# Patient Record
Sex: FEMALE | Race: WHITE | Employment: OTHER | URBAN - METROPOLITAN AREA
[De-identification: names, ages, dates, MRNs, and addresses within clinical notes are randomized per-mention and may not be internally consistent; named-entity substitution may affect disease eponyms.]

---

## 2017-02-13 ENCOUNTER — OFFICE VISIT (OUTPATIENT)
Dept: OBGYN | Facility: CLINIC | Age: 72
End: 2017-02-13
Payer: COMMERCIAL

## 2017-02-13 ENCOUNTER — TRANSFERRED RECORDS (OUTPATIENT)
Dept: HEALTH INFORMATION MANAGEMENT | Facility: CLINIC | Age: 72
End: 2017-02-13

## 2017-02-13 VITALS
SYSTOLIC BLOOD PRESSURE: 124 MMHG | WEIGHT: 117 LBS | DIASTOLIC BLOOD PRESSURE: 74 MMHG | BODY MASS INDEX: 20.73 KG/M2 | HEART RATE: 80 BPM | HEIGHT: 63 IN

## 2017-02-13 DIAGNOSIS — Z01.419 ENCOUNTER FOR GYNECOLOGICAL EXAMINATION WITHOUT ABNORMAL FINDING: Primary | ICD-10-CM

## 2017-02-13 DIAGNOSIS — B97.7 HPV IN FEMALE: ICD-10-CM

## 2017-02-13 PROBLEM — I25.10 CARDIOVASCULAR DISEASE: Status: ACTIVE | Noted: 2017-02-13

## 2017-02-13 PROCEDURE — 88175 CYTOPATH C/V AUTO FLUID REDO: CPT | Performed by: OBSTETRICS & GYNECOLOGY

## 2017-02-13 PROCEDURE — 87624 HPV HI-RISK TYP POOLED RSLT: CPT | Performed by: OBSTETRICS & GYNECOLOGY

## 2017-02-13 PROCEDURE — 99397 PER PM REEVAL EST PAT 65+ YR: CPT | Performed by: OBSTETRICS & GYNECOLOGY

## 2017-02-13 RX ORDER — NITROGLYCERIN 0.4 MG/1
0.4 TABLET SUBLINGUAL
COMMUNITY
Start: 2016-12-16

## 2017-02-13 RX ORDER — CYANOCOBALAMIN (VITAMIN B-12) 2000 MCG
TABLET ORAL
COMMUNITY
Start: 2015-04-24

## 2017-02-13 ASSESSMENT — PATIENT HEALTH QUESTIONNAIRE - PHQ9: 5. POOR APPETITE OR OVEREATING: NOT AT ALL

## 2017-02-13 ASSESSMENT — ANXIETY QUESTIONNAIRES
7. FEELING AFRAID AS IF SOMETHING AWFUL MIGHT HAPPEN: NOT AT ALL
6. BECOMING EASILY ANNOYED OR IRRITABLE: NOT AT ALL
IF YOU CHECKED OFF ANY PROBLEMS ON THIS QUESTIONNAIRE, HOW DIFFICULT HAVE THESE PROBLEMS MADE IT FOR YOU TO DO YOUR WORK, TAKE CARE OF THINGS AT HOME, OR GET ALONG WITH OTHER PEOPLE: NOT DIFFICULT AT ALL
3. WORRYING TOO MUCH ABOUT DIFFERENT THINGS: NOT AT ALL
GAD7 TOTAL SCORE: 0
1. FEELING NERVOUS, ANXIOUS, OR ON EDGE: NOT AT ALL
2. NOT BEING ABLE TO STOP OR CONTROL WORRYING: NOT AT ALL
5. BEING SO RESTLESS THAT IT IS HARD TO SIT STILL: NOT AT ALL

## 2017-02-13 NOTE — PROGRESS NOTES
Zoraida is a 71 year old  female who presents for annual exam.     Besides routine health maintenance, she has no other health concerns today .    Do you have a Health Care Directive?: No: Advance care planning was reviewed with patient; patient declined at this time.    Fall risk:   Fallen 2 or more times in the past year?: No  Any fall with injury in the past year?: No    HPI:  The patient's PCP is Dr. Hernandez at [facility].    Patient has history of cardiac disease and has stents  Hasn't been seeing cardiology for awhile because her md retired  rec she make appt with Dr Rodrigez    History of chronic +HPV, prior hyst   Pap of vaginal cuff yearly    GYNECOLOGIC HISTORY:  No LMP recorded. Patient is postmenopausal..   reports that she has been smoking.  She has been smoking about 0.50 packs per day. She does not have any smokeless tobacco history on file.  Tobacco Cessation Action Plan: Information offered: Patient not interested at this time  Patient is sexually active.  STD testing offered?  Declined  Last PHQ-9 score on record=   PHQ-9 SCORE 2017   Total Score 0     Last GAD7 score on record=   LOC-7 SCORE 2/10/2016 2017   Total Score 0 0     Alcohol Score = 4    HEALTH MAINTENANCE:  Cholesterol: done about 1 month ago with Dr. Hernandez  Last Mammo: one year ago, Result: normal, Next Mammo: does not have one schedule yet   Pap:   Lab Results   Component Value Date    PAP NIL 02/10/2016    HPV+  DEXA:  3/26/2015  Colonoscopy:  2016, Result:  Normal, polyps, Next Colonoscopy: 4 years.    Health maintenance updated:  yes    HISTORY:  Obstetric History       T2      TAB0   SAB4   E0   M0   L2       # Outcome Date GA Lbr Charlie/2nd Weight Sex Delivery Anes PTL Lv   6 Term            5 Term            4 SAB            3 SAB            2 SAB            1 SAB                 Patient Active Problem List   Diagnosis     Smoking     History of cervical dysplasia     Gastroesophageal reflux  "disease without esophagitis     Dyslipidemia     Cardiovascular disease     Past Surgical History   Procedure Laterality Date     Laparoscopic assisted hysterectomy vaginal, bilateral salpingo-oophorectomy, combined  2006     recurrent PHILIPPE I      Social History   Substance Use Topics     Smoking status: Current Every Day Smoker     Packs/day: 0.50     Smokeless tobacco: Not on file     Alcohol use 0.0 oz/week     0 Standard drinks or equivalent per week      Problem (# of Occurrences) Relation (Name,Age of Onset)    Coronary Artery Disease (3) Mother, Father, Brother    Hyperlipidemia (3) Mother, Father, Brother            Current Outpatient Prescriptions   Medication Sig     VITAMIN D, CHOLECALCIFEROL, PO Take by mouth daily     Cyanocobalamin (B-12) 2000 MCG TABS Take 2,000-2,500 mcg by mouth     nitroglycerin (NITROSTAT) 0.4 MG sublingual tablet Place 0.4 mg under the tongue     aspirin 81 MG chewable tablet Take 1 tablet (81 mg) by mouth daily     atenolol (TENORMIN) 25 MG tablet Take 1 tablet (25 mg) by mouth daily     atorvastatin (LIPITOR) 10 MG tablet Take 20 mg by mouth daily      Multiple Vitamin (VITAMIN E/FOLIC ACID/B-6/B-12) CAPS Reported on 2/13/2017     No current facility-administered medications for this visit.        Allergies   Allergen Reactions     Penicillins      Sulfa Drugs        Past medical, surgical, social and family history were reviewed and updated in EPIC.    ROS:   12 point review of systems negative other than symptoms noted below.  Head: Nasal Congestion and Ringing  Respiratory: Cough  Genitourinary: Hot Flashes  Skin: Skin Dryness    EXAM:  /74 (BP Location: Right arm)  Pulse 80  Ht 5' 3\" (1.6 m)  Wt 117 lb (53.1 kg)  Breastfeeding? No  BMI 20.73 kg/m2   BMI: Body mass index is 20.73 kg/(m^2).    EXAM:  Constitutional: Appearance: Well nourished, well developed alert, in no acute distress  Neck:  Lymph Nodes:  No lymphadenopathy present    Thyroid:  Gland size normal, " nontender, no nodules or masses present  on palpation  Chest:  Respiratory Effort:  Breathing unlabored  Cardiovascular:Heart    Auscultation:  Regular rate, normal rhythm, no murmurs present  Breasts: Inspection of Breasts:  No lymphadenopathy present    Palpation of Breasts and Axillae:  No masses present on palpation, no  breast tenderness    Axillary Lymph Nodes:  No lymphadenopathy present  Gastrointestinal:  Abdominal Examination:  Abdomen nontender to palpation, tone normal without     rigidity or guarding, no masses present, umbilicus without lesions    Liver and speen:  No hepatomegaly present, liver nontender to palpation    Hernias:  No hernias present  Lymphatic: Lymph Nodes:  No other lymphadenopathy present  Skin:  General Inspection:  No rashes present, no lesions present, no areas of  discoloration.    Genitalia and Groin:  No rashes present, no lesions present, no areas of  discoloration, no masses present  Neurologic/Psychiatric:    Mental Status:  Oriented X3     Pelvic Exam:  External Genitalia:     Normal appearance for age, no discharge present, no tenderness present, no inflammatory lesions present, color normal  Vagina:     Normal vaginal vault without central or paravaginal defects, no discharge present, no inflammatory lesions present, no masses present  Bladder:     Nontender to palpation  Urethra:   Urethral Body:  Urethra palpation normal, urethra structural support normal   Urethral Meatus:  No erythema or lesions present  Cervix:     Surgically absent  Uterus:     Surgically absent  Adnexa:     No adnexal tenderness present, no adnexal masses present  Perineum:     Perineum within normal limits, no evidence of trauma, no rashes or skin lesions present  Anus:     Anus within normal limits, no hemorrhoids present  Inguinal Lymph Nodes:     No lymphadenopathy present  Pubic Hair:     Normal pubic hair distribution for age  Genitalia and Groin:     No rashes present, no lesions present, no  areas of discoloration, no masses present    COUNSELING:   Reviewed preventive health counseling, as reflected in patient instructions       discussed cardiac f/u    BMI:  Body mass index is 20.73 kg/(m^2).  Weight management plan noted, stable and monitoring   reports that she has been smoking.  She has been smoking about 0.50 packs per day. She does not have any smokeless tobacco history on file.  Tobacco Cessation Action Plan: Information offered: Patient not interested at this time    ASSESSMENT:  71 year old female with satisfactory annual exam.    ICD-10-CM    1. Encounter for gynecological examination without abnormal finding Z01.419        PLAN:  Pap done  Return 1 years  Annual mammogram    Jazmin Perez MD

## 2017-02-13 NOTE — Clinical Note
Please abstract the following data from this visit with this patient into the appropriate field in Epic:  Colonoscopy done on this date: 10/31/2016 (approximately), by this group: [facility], results were Normal, Polyps, Repeat 5 years.

## 2017-02-13 NOTE — MR AVS SNAPSHOT
"              After Visit Summary   2017    Zoraida Hammond    MRN: [id number]           Patient Information     Date Of Birth          1945        Visit Information        Provider Department      2017 8:30 AM Jazmin Perez MD [facility] [place]        Today's Diagnoses     Encounter for gynecological examination without abnormal finding    -  1       Follow-ups after your visit        Who to contact     If you have questions or need follow up information about today's clinic visit or your schedule please contact [facility] directly at [phone].  Normal or non-critical lab and imaging results will be communicated to you by Flipzuhart, letter or phone within 4 business days after the clinic has received the results. If you do not hear from us within 7 days, please contact the clinic through Tesarist or phone. If you have a critical or abnormal lab result, we will notify you by phone as soon as possible.  Submit refill requests through Guidesly or call your pharmacy and they will forward the refill request to us. Please allow 3 business days for your refill to be completed.          Additional Information About Your Visit        MyChart Information     Guidesly lets you send messages to your doctor, view your test results, renew your prescriptions, schedule appointments and more. To sign up, go to [email or web address] . Click on \"Log in\" on the left side of the screen, which will take you to the Welcome page. Then click on \"Sign up Now\" on the right side of the page.     You will be asked to enter the access code listed below, as well as some personal information. Please follow the directions to create your username and password.     Your access code is: [email or web address]  Expires: 2017  8:57 AM     Your access code will  in 90 days. If you need help or a new code, please call your [place] clinic or [phone].        Care EveryWhere ID     This is your " "Care EveryWhere ID. This could be used by other organizations to access your [place] medical records  [id number]        Your Vitals Were     Pulse Height Breastfeeding? BMI (Body Mass Index)          80 5' 3\" (1.6 m) No 20.73 kg/m2         Blood Pressure from Last 3 Encounters:   02/13/17 124/74   02/10/16 126/70   02/04/15 124/86    Weight from Last 3 Encounters:   02/13/17 117 lb (53.1 kg)   02/10/16 119 lb (54 kg)   02/04/15 116 lb (52.6 kg)              Today, you had the following     No orders found for display       Primary Care Provider Office Phone # Fax #    James Hernandez [phone]       [facility] [address] [facility] [address]  [facility] [address]        Thank you!     Thank you for choosing [facility] [place]  for your care. Our goal is always to provide you with excellent care. Hearing back from our patients is one way we can continue to improve our services. Please take a few minutes to complete the written survey that you may receive in the mail after your visit with us. Thank you!             Your Updated Medication List - Protect others around you: Learn how to safely use, store and throw away your medicines at www.disposemymeds.org.          This list is accurate as of: 2/13/17  8:57 AM.  Always use your most recent med list.                   Brand Name Dispense Instructions for use    aspirin 81 MG chewable tablet     108 tablet    Take 1 tablet (81 mg) by mouth daily       atenolol 25 MG tablet    TENORMIN    90 tablet    Take 1 tablet (25 mg) by mouth daily       atorvastatin 10 MG tablet    LIPITOR    30 tablet    Take 20 mg by mouth daily       B-12 2000 MCG Tabs      Take 2,000-2,500 mcg by mouth       nitroglycerin 0.4 MG sublingual tablet    NITROSTAT     Place 0.4 mg under the tongue       VITAMIN D (CHOLECALCIFEROL) PO      Take by mouth daily       Vitamin E/Folic Acid/B-6/B-12 Caps      Reported on 2/13/2017         "

## 2017-02-13 NOTE — NURSING NOTE
Please abstract the following data from this visit with this patient into the appropriate field in Epic:    Colonoscopy done on this date: 10/31/2016 (approximately), by this group: [facility], results were Normal, Polyps, Repeat 5 years.

## 2017-02-14 ASSESSMENT — PATIENT HEALTH QUESTIONNAIRE - PHQ9: SUM OF ALL RESPONSES TO PHQ QUESTIONS 1-9: 0

## 2017-02-14 ASSESSMENT — ANXIETY QUESTIONNAIRES: GAD7 TOTAL SCORE: 0

## 2017-02-15 LAB
COPATH REPORT: NORMAL
PAP: NORMAL

## 2017-02-17 LAB
FINAL DIAGNOSIS: ABNORMAL
HPV HR 12 DNA CVX QL NAA+PROBE: POSITIVE
HPV16 DNA SPEC QL NAA+PROBE: NEGATIVE
HPV18 DNA SPEC QL NAA+PROBE: NEGATIVE
SPECIMEN DESCRIPTION: ABNORMAL

## 2018-02-14 ENCOUNTER — OFFICE VISIT (OUTPATIENT)
Dept: OBGYN | Facility: CLINIC | Age: 73
End: 2018-02-14
Payer: COMMERCIAL

## 2018-02-14 ENCOUNTER — RADIANT APPOINTMENT (OUTPATIENT)
Dept: MAMMOGRAPHY | Facility: CLINIC | Age: 73
End: 2018-02-14
Payer: COMMERCIAL

## 2018-02-14 ENCOUNTER — RADIANT APPOINTMENT (OUTPATIENT)
Dept: BONE DENSITY | Facility: CLINIC | Age: 73
End: 2018-02-14
Payer: COMMERCIAL

## 2018-02-14 VITALS
DIASTOLIC BLOOD PRESSURE: 60 MMHG | WEIGHT: 112 LBS | SYSTOLIC BLOOD PRESSURE: 100 MMHG | BODY MASS INDEX: 19.84 KG/M2 | HEIGHT: 63 IN | HEART RATE: 84 BPM

## 2018-02-14 DIAGNOSIS — Z01.419 ENCOUNTER FOR GYNECOLOGICAL EXAMINATION WITHOUT ABNORMAL FINDING: Primary | ICD-10-CM

## 2018-02-14 DIAGNOSIS — B97.7 HPV IN FEMALE: ICD-10-CM

## 2018-02-14 DIAGNOSIS — Z78.0 ASYMPTOMATIC POSTMENOPAUSAL STATE: ICD-10-CM

## 2018-02-14 DIAGNOSIS — Z12.31 VISIT FOR SCREENING MAMMOGRAM: ICD-10-CM

## 2018-02-14 PROCEDURE — G0145 SCR C/V CYTO,THINLAYER,RESCR: HCPCS | Performed by: OBSTETRICS & GYNECOLOGY

## 2018-02-14 PROCEDURE — 77063 BREAST TOMOSYNTHESIS BI: CPT | Mod: TC

## 2018-02-14 PROCEDURE — 77080 DXA BONE DENSITY AXIAL: CPT | Performed by: OBSTETRICS & GYNECOLOGY

## 2018-02-14 PROCEDURE — 77067 SCR MAMMO BI INCL CAD: CPT | Mod: TC

## 2018-02-14 PROCEDURE — 99397 PER PM REEVAL EST PAT 65+ YR: CPT | Performed by: OBSTETRICS & GYNECOLOGY

## 2018-02-14 ASSESSMENT — ANXIETY QUESTIONNAIRES
3. WORRYING TOO MUCH ABOUT DIFFERENT THINGS: NOT AT ALL
5. BEING SO RESTLESS THAT IT IS HARD TO SIT STILL: NOT AT ALL
GAD7 TOTAL SCORE: 0
1. FEELING NERVOUS, ANXIOUS, OR ON EDGE: NOT AT ALL
7. FEELING AFRAID AS IF SOMETHING AWFUL MIGHT HAPPEN: NOT AT ALL
IF YOU CHECKED OFF ANY PROBLEMS ON THIS QUESTIONNAIRE, HOW DIFFICULT HAVE THESE PROBLEMS MADE IT FOR YOU TO DO YOUR WORK, TAKE CARE OF THINGS AT HOME, OR GET ALONG WITH OTHER PEOPLE: NOT DIFFICULT AT ALL
2. NOT BEING ABLE TO STOP OR CONTROL WORRYING: NOT AT ALL
6. BECOMING EASILY ANNOYED OR IRRITABLE: NOT AT ALL

## 2018-02-14 ASSESSMENT — PATIENT HEALTH QUESTIONNAIRE - PHQ9: 5. POOR APPETITE OR OVEREATING: NOT AT ALL

## 2018-02-14 NOTE — MR AVS SNAPSHOT
After Visit Summary   2/14/2018    Zoraida Hammond    MRN: [id number]           Patient Information     Date Of Birth          1945        Visit Information        Provider Department      2/14/2018 10:30 AM Jazmin Perez MD [facility]        Today's Diagnoses     Encounter for gynecological examination without abnormal finding    -  1    HPV in female           Follow-ups after your visit        Your next 10 appointments already scheduled     Feb 14, 2018 11:30 AM CST   DX HIP/PELVIS/SPINE with [id number]   [facility]    [address] [place] [id number] [phone]           Please do not take any of the following 24 hours prior to the day of your exam: vitamins, calcium tablets, antacids.  If possible, please wear clothes without metal (snaps, zippers). A sweatsuit works well.              Future tests that were ordered for you today     Open Future Orders        Priority Expected Expires Ordered    DX Hip/Pelvis/Spine Routine  2/14/2019 1/30/2018            Who to contact     If you have questions or need follow up information about today's clinic visit or your schedule please contact [facility] directly at [phone].  Normal or non-critical lab and imaging results will be communicated to you by infoBizzhart, letter or phone within 4 business days after the clinic has received the results. If you do not hear from us within 7 days, please contact the clinic through infoBizzhart or phone. If you have a critical or abnormal lab result, we will notify you by phone as soon as possible.  Submit refill requests through Doodle or call your pharmacy and they will forward the refill request to us. Please allow 3 business days for your refill to be completed.          Additional Information About Your Visit        Doodle Information     Doodle lets you send messages to your doctor, view  "your test results, renew your prescriptions, schedule appointments and more. To sign up, go to [email or web address] . Click on \"Log in\" on the left side of the screen, which will take you to the Welcome page. Then click on \"Sign up Now\" on the right side of the page.     You will be asked to enter the access code listed below, as well as some personal information. Please follow the directions to create your username and password.     Your access code is: [email or web address]  Expires: 5/15/2018 11:19 AM     Your access code will  in 90 days. If you need help or a new code, please call your [place] clinic or [phone].        Care EveryWhere ID     This is your Care EveryWhere ID. This could be used by other organizations to access your [place] medical records  [id number]        Your Vitals Were     Pulse Height Breastfeeding? BMI (Body Mass Index)          84 5' 2.5\" (1.588 m) No 20.16 kg/m2         Blood Pressure from Last 3 Encounters:   18 100/60   17 124/74   02/10/16 126/70    Weight from Last 3 Encounters:   18 112 lb (50.8 kg)   17 117 lb (53.1 kg)   02/10/16 119 lb (54 kg)              We Performed the Following     Pap imaged thin layer screen only - recommended age 21 - 24 years        Primary Care Provider Office Phone # Fax #    James Hernandez [phone]       [facility] [address] [facility] [address]  [facility] [address]        Equal Access to Services     [facility] : Hadii idlma Barriga, wageorgetetda pardeep, qaybta antonioalisidoro tineo. So [facility] [phone].    ATENCIÓN: Si habla español, tiene a barnes disposición servicios gratuitos de asistencia lingüística. eLe al [phone].    We comply with applicable federal civil rights laws and [place] laws. We do not discriminate on the basis of race, color, national origin, age, disability, sex, sexual orientation, or gender identity.            Thank " you!     Thank you for choosing [facility] [place]  for your care. Our goal is always to provide you with excellent care. Hearing back from our patients is one way we can continue to improve our services. Please take a few minutes to complete the written survey that you may receive in the mail after your visit with us. Thank you!             Your Updated Medication List - Protect others around you: Learn how to safely use, store and throw away your medicines at www.disposemymeds.org.          This list is accurate as of 2/14/18 11:19 AM.  Always use your most recent med list.                   Brand Name Dispense Instructions for use Diagnosis    aspirin 81 MG chewable tablet     108 tablet    Take 1 tablet (81 mg) by mouth daily        atenolol 25 MG tablet    TENORMIN    90 tablet    Take 1 tablet (25 mg) by mouth daily        atorvastatin 10 MG tablet    LIPITOR    30 tablet    Take 20 mg by mouth daily        B-12 2000 MCG Tabs      Take 2,000-2,500 mcg by mouth        nitroGLYcerin 0.4 MG sublingual tablet    NITROSTAT     Place 0.4 mg under the tongue        VITAMIN D (CHOLECALCIFEROL) PO      Take by mouth daily        Vitamin E/Folic Acid/B-6/B-12 Caps      Reported on 2/13/2017

## 2018-02-14 NOTE — LETTER
February 19, 2018      Zoraida Hammond  [address] [facility] [address]    Dear ,      I am happy to inform you that your recent cervical cancer screening test (PAP smear) was normal.      Preventative screenings such as this help to ensure your health for years to come. You should repeat a pap smear in 1 year, unless otherwise directed.      You will still need to return to the clinic every year for your annual exam and other preventive tests.     Please contact the clinic at [phone] if you have further questions.       Sincerely,      Jazmin Perez MD/ken

## 2018-02-14 NOTE — PROGRESS NOTES
Zoraida is a 72 year old  female who presents for annual exam.     Besides routine health maintenance, she has no other health concerns today .    Do you have a Health Care Directive?: No: Advance care planning reviewed with patient; information given to patient to review.    Fall risk:   Fallen 2 or more times in the past year?: No  Any fall with injury in the past year?: No    HPI:  The patient's PCP is  James Hernandez MD.    Patient has cardiac stents and hasn't seen cardiology for awhile  Gave her name of Dr Rodrigez since hers retired  Needs to stop smoking    Chronic vaginal dysplasia  We are doing yearly pap only to follow without hpv testing     GYNECOLOGIC HISTORY:  No LMP recorded. Patient is postmenopausal..   reports that she has been smoking.  She has been smoking about 0.50 packs per day. She has never used smokeless tobacco.  Tobacco Cessation Action Plan: Information offered: Patient not interested at this time  Patient is sexually active.  STD testing offered?  Declined  Last PHQ-9 score on record=   PHQ-9 SCORE 2018   Total Score 0     Last GAD7 score on record=   LOC-7 SCORE 2/10/2016 2017 2018   Total Score 0 0 0     Alcohol Score = 4    HEALTH MAINTENANCE:  Cholesterol: 2016   Total= 169, Triglycerides=77, HDL=57, LDL=97  Last Mammo: 2017, Result: normal, Next Mammo: Due   Pap: 2017 Neg, Other HPV +  Lab Results   Component Value Date    PAP NIL 2017    PAP NIL 02/10/2016      DEXA:  3/26/2015  Colonoscopy:  10/31/2016, Result:  Polyps/diverticulosis, Next Colonoscopy: 5 years.    Health maintenance updated:  yes    HISTORY:  Obstetric History       T2      L2     SAB4   TAB0   Ectopic0   Multiple0   Live Births0       # Outcome Date GA Lbr Charlie/2nd Weight Sex Delivery Anes PTL Lv   6 Term            5 Term            4 SAB            3 SAB            2 SAB            1 SAB                 Patient Active Problem List   Diagnosis      "Smoking     History of cervical dysplasia     Gastroesophageal reflux disease without esophagitis     Dyslipidemia     Cardiovascular disease     Past Surgical History:   Procedure Laterality Date     LAPAROSCOPIC ASSISTED HYSTERECTOMY VAGINAL, BILATERAL SALPINGO-OOPHORECTOMY, COMBINED  2006    recurrent PHILIPPE I      Social History   Substance Use Topics     Smoking status: Current Every Day Smoker     Packs/day: 0.50     Smokeless tobacco: Never Used     Alcohol use 0.0 oz/week     0 Standard drinks or equivalent per week      Problem (# of Occurrences) Relation (Name,Age of Onset)    Coronary Artery Disease (3) Mother, Father, Brother    Hyperlipidemia (3) Mother, Father, Brother            Current Outpatient Prescriptions   Medication Sig     VITAMIN D, CHOLECALCIFEROL, PO Take by mouth daily     Cyanocobalamin (B-12) 2000 MCG TABS Take 2,000-2,500 mcg by mouth     nitroglycerin (NITROSTAT) 0.4 MG sublingual tablet Place 0.4 mg under the tongue     aspirin 81 MG chewable tablet Take 1 tablet (81 mg) by mouth daily     atenolol (TENORMIN) 25 MG tablet Take 1 tablet (25 mg) by mouth daily     atorvastatin (LIPITOR) 10 MG tablet Take 20 mg by mouth daily      Multiple Vitamin (VITAMIN E/FOLIC ACID/B-6/B-12) CAPS Reported on 2/13/2017     No current facility-administered medications for this visit.        Allergies   Allergen Reactions     Penicillins      Sulfa Drugs        Past medical, surgical, social and family history were reviewed and updated in EPIC.    ROS:   12 point review of systems negative other than symptoms noted below.  Head: Nasal Congestion  Genitourinary: Hot Flashes and Night Sweats  Skin: Skin Dryness  Endocrine: Loss of Hair    EXAM:  /60  Pulse 84  Ht 5' 2.5\" (1.588 m)  Wt 112 lb (50.8 kg)  Breastfeeding? No  BMI 20.16 kg/m2   BMI: Body mass index is 20.16 kg/(m^2).    EXAM:  Constitutional: Appearance: Well nourished, well developed alert, in no acute distress  Neck:  Lymph Nodes:  " No lymphadenopathy present    Thyroid:  Gland size normal, nontender, no nodules or masses present  on palpation  Chest:  Respiratory Effort:  Breathing unlabored  Cardiovascular:Heart    Auscultation:  Regular rate, normal rhythm, no murmurs present  Breasts: Inspection of Breasts:  No lymphadenopathy present., Palpation of Breasts and Axillae:  No masses present on palpation, no breast tenderness., Axillary Lymph Nodes:  No lymphadenopathy present. and No nodularity, asymmetry or nipple discharge bilaterally.  Gastrointestinal:  Abdominal Examination:  Abdomen nontender to palpation, tone normal without     rigidity or guarding, no masses present, umbilicus without lesions    Liver and speen:  No hepatomegaly present, liver nontender to palpation    Hernias:  No hernias present  Lymphatic: Lymph Nodes:  No other lymphadenopathy present  Skin:  General Inspection:  No rashes present, no lesions present, no areas of  discoloration.    Genitalia and Groin:  No rashes present, no lesions present, no areas of  discoloration, no masses present  Neurologic/Psychiatric:    Mental Status:  Oriented X3     Pelvic Exam:  External Genitalia:     Normal appearance for age, no discharge present, no tenderness present, no inflammatory lesions present, color normal  Vagina:     Normal vaginal vault without central or paravaginal defects, no discharge present, no inflammatory lesions present, no masses present  Bladder:     Nontender to palpation  Urethra:   Urethral Body:  Urethra palpation normal, urethra structural support normal   Urethral Meatus:  No erythema or lesions present  Cervix:     Surgically absent  Uterus:     Surgically absent  Adnexa:     No adnexal tenderness present, no adnexal masses present  Perineum:     Perineum within normal limits, no evidence of trauma, no rashes or skin lesions present  Anus:     Anus within normal limits, no hemorrhoids present  Inguinal Lymph Nodes:     No lymphadenopathy  present  Pubic Hair:     Normal pubic hair distribution for age  Genitalia and Groin:     No rashes present, no lesions present, no areas of discoloration, no masses present      COUNSELING:   Reviewed preventive health counseling, as reflected in patient instructions       smoking cessation discussed    BMI:  Body mass index is 20.16 kg/(m^2).     reports that she has been smoking.  She has been smoking about 0.50 packs per day. She has never used smokeless tobacco.  Tobacco Cessation Action Plan: Self help information given to patient    ASSESSMENT:  72 year old female with satisfactory annual exam.    ICD-10-CM    1. Encounter for gynecological examination without abnormal finding Z01.419 Pap imaged thin layer screen only - recommended age 21 - 24 years   2. HPV in female B97.7        PLAN:  rec stop smoking  F/u with cardiology  Annual pap only to follow for history of chronic vaginal hpv.  Will only proceed to colposcopy if VAIN 2 or greater.     Jazmin Perez MD

## 2018-02-15 ASSESSMENT — PATIENT HEALTH QUESTIONNAIRE - PHQ9: SUM OF ALL RESPONSES TO PHQ QUESTIONS 1-9: 0

## 2018-02-15 ASSESSMENT — ANXIETY QUESTIONNAIRES: GAD7 TOTAL SCORE: 0

## 2018-02-16 LAB
COPATH REPORT: NORMAL
PAP: NORMAL

## 2018-02-18 PROBLEM — N89.3 VAGINAL DYSPLASIA: Status: ACTIVE | Noted: 2018-02-18

## 2019-02-18 ENCOUNTER — OFFICE VISIT (OUTPATIENT)
Dept: OBGYN | Facility: CLINIC | Age: 74
End: 2019-02-18
Payer: COMMERCIAL

## 2019-02-18 ENCOUNTER — ANCILLARY PROCEDURE (OUTPATIENT)
Dept: MAMMOGRAPHY | Facility: CLINIC | Age: 74
End: 2019-02-18
Payer: COMMERCIAL

## 2019-02-18 VITALS
BODY MASS INDEX: 20.02 KG/M2 | SYSTOLIC BLOOD PRESSURE: 120 MMHG | WEIGHT: 113 LBS | DIASTOLIC BLOOD PRESSURE: 88 MMHG | HEIGHT: 63 IN

## 2019-02-18 DIAGNOSIS — Z12.31 VISIT FOR SCREENING MAMMOGRAM: ICD-10-CM

## 2019-02-18 DIAGNOSIS — Z01.419 ENCOUNTER FOR GYNECOLOGICAL EXAMINATION WITHOUT ABNORMAL FINDING: Primary | ICD-10-CM

## 2019-02-18 PROCEDURE — 77067 SCR MAMMO BI INCL CAD: CPT | Mod: TC

## 2019-02-18 PROCEDURE — G0145 SCR C/V CYTO,THINLAYER,RESCR: HCPCS | Performed by: OBSTETRICS & GYNECOLOGY

## 2019-02-18 PROCEDURE — 77063 BREAST TOMOSYNTHESIS BI: CPT | Mod: TC

## 2019-02-18 PROCEDURE — 99397 PER PM REEVAL EST PAT 65+ YR: CPT | Performed by: OBSTETRICS & GYNECOLOGY

## 2019-02-18 PROCEDURE — 87624 HPV HI-RISK TYP POOLED RSLT: CPT | Performed by: OBSTETRICS & GYNECOLOGY

## 2019-02-18 ASSESSMENT — ANXIETY QUESTIONNAIRES
7. FEELING AFRAID AS IF SOMETHING AWFUL MIGHT HAPPEN: NOT AT ALL
5. BEING SO RESTLESS THAT IT IS HARD TO SIT STILL: NOT AT ALL
3. WORRYING TOO MUCH ABOUT DIFFERENT THINGS: NOT AT ALL
IF YOU CHECKED OFF ANY PROBLEMS ON THIS QUESTIONNAIRE, HOW DIFFICULT HAVE THESE PROBLEMS MADE IT FOR YOU TO DO YOUR WORK, TAKE CARE OF THINGS AT HOME, OR GET ALONG WITH OTHER PEOPLE: NOT DIFFICULT AT ALL
6. BECOMING EASILY ANNOYED OR IRRITABLE: NOT AT ALL
1. FEELING NERVOUS, ANXIOUS, OR ON EDGE: NOT AT ALL
2. NOT BEING ABLE TO STOP OR CONTROL WORRYING: NOT AT ALL
GAD7 TOTAL SCORE: 0

## 2019-02-18 ASSESSMENT — PATIENT HEALTH QUESTIONNAIRE - PHQ9
5. POOR APPETITE OR OVEREATING: NOT AT ALL
SUM OF ALL RESPONSES TO PHQ QUESTIONS 1-9: 0

## 2019-02-18 ASSESSMENT — MIFFLIN-ST. JEOR: SCORE: 973.75

## 2019-02-18 NOTE — LETTER
February 25, 2019    Zoraida Hammond  [address] [facility] [id number]    Dear ,  This letter is regarding your recent Pap smear (cervical cancer screening) and Human Papillomavirus (HPV) test.  We are happy to inform you that your Pap smear result is normal. Cervical cancer is closely linked with certain types of HPV. Your results showed no evidence of high-risk HPV.  Therefore we recommend you return in 1 year for your next pap smear.  You will still need to return to the clinic every year for an annual exam and other preventive tests.  If you have additional questions regarding this result, please call our registered nurse, Katelynn at [phone].  Sincerely,    Jazmin Perez MD/ken

## 2019-02-18 NOTE — PROGRESS NOTES
Zoraida is a 73 year old  female who presents for annual exam.     Besides routine health maintenance, she has no other health concerns today .    Do you have a Health Care Directive?: No: Advance care planning was reviewed with patient; patient declined at this time.    Fall risk:   Fallen 2 or more times in the past year?: No  Any fall with injury in the past year?: No    HPI:  The patient's PCP is  James Hernandez MD.   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 Patient is postmenopausal..   reports that she has been smoking.  She has been smoking about 0.50 packs per day. she has never used smokeless tobacco.  Tobacco Cessation Action Plan: Information offered: Patient not interested at this time  Patient is sexually active.  STD testing offered?  Declined  Last PHQ-9 score on record=   PHQ-9 SCORE 2019   PHQ-9 Total Score 0     Last GAD7 score on record=   LOC-7 SCORE 2017   Total Score 0 0 0     Alcohol Score = 4    HEALTH MAINTENANCE:  Cholesterol: (No results found for: CHOL Done with PCP  Last Mammo: one year ago, Result: normal, Next Mammo: today   Pap:   Lab Results   Component Value Date    PAP NIL 2018    PAP NIL 2017    PAP NIL 02/10/2016      DEXA:    Colonoscopy:  , Result:  polyps, Next Colonoscopy:  years.    Health maintenance updated:  yes    HISTORY:  Obstetric History       T2      L2     SAB4   TAB0   Ectopic0   Multiple0   Live Births0       # Outcome Date GA Lbr Charlie/2nd Weight Sex Delivery Anes PTL Lv   6 Term            5 Term            4 SAB            3 SAB            2 SAB            1 SAB                 Patient Active Problem List   Diagnosis     Smoking     History of cervical dysplasia     Gastroesophageal reflux disease without esophagitis     Dyslipidemia     Cardiovascular disease     Vaginal  "dysplasia     Past Surgical History:   Procedure Laterality Date     HYSTERECTOMY, PAP STILL INDICATED      PAP ONLY.      LAPAROSCOPIC ASSISTED HYSTERECTOMY VAGINAL, BILATERAL SALPINGO-OOPHORECTOMY, COMBINED  2006    recurrent PH[facility]IPPE I      Social History     Tobacco Use     Smoking status: Current Every Day Smoker     Packs/day: 0.50     Smokeless tobacco: Never Used   Substance Use Topics     Alcohol use: Yes     Alcohol/week: 0.0 oz      Problem (# of Occurrences) Relation (Name,Age of Onset)    Coronary Artery Disease (3) Mother, Father, Brother    Hyperlipidemia (3) Mother, Father, Brother            Current Outpatient Medications   Medication Sig     aspirin 81 MG chewable tablet Take 1 tablet (81 mg) by mouth daily     atenolol (TENORMIN) 25 MG tablet Take 1 tablet (25 mg) by mouth daily     atorvastatin (LIPITOR) 10 MG tablet Take 20 mg by mouth daily      Cyanocobalamin (B-12) 2000 MCG TABS Take 2,000-2,500 mcg by mouth     VITAMIN D, CHOLECALCIFEROL, PO Take by mouth daily     Multiple Vitamin (VITAMIN E/FOLIC ACID/B-6/B-12) CAPS Reported on 2/13/2017     nitroglycerin (NITROSTAT) 0.4 MG sublingual tablet Place 0.4 mg under the tongue     No current facility-administered medications for this visit.        Allergies   Allergen Reactions     Penicillins      Sulfa Drugs        Past medical, surgical, social and family history were reviewed and updated in EPIC.    ROS:   12 point review of systems negative other than symptoms noted below.  Head: Nasal Congestion  Genitourinary: Night Sweats  Skin: Skin Dryness  Endocrine: Loss of Hair    EXAM:  /88   Ht 1.588 m (5' 2.5\")   Wt 51.3 kg (113 lb)   Breastfeeding? No   BMI 20.34 kg/m     BMI: Body mass index is 20.34 kg/m .    EXAM:  Constitutional: Appearance: Well nourished, well developed alert, in no acute distress  Neck:  Lymph Nodes:  No lymphadenopathy present    Thyroid:  Gland size normal, nontender, no nodules or masses present  on " palpation  Chest:  Respiratory Effort:  Breathing unlabored  Cardiovascular:Heart    Auscultation:  Regular rate, normal rhythm, no murmurs present  Breasts: Inspection of Breasts:  No lymphadenopathy present., Palpation of Breasts and Axillae:  No masses present on palpation, no breast tenderness., Axillary Lymph Nodes:  No lymphadenopathy present. and No nodularity, asymmetry or nipple discharge bilaterally.  Gastrointestinal:  Abdominal Examination:  Abdomen nontender to palpation, tone normal without     rigidity or guarding, no masses present, umbilicus without lesions    Liver and speen:  No hepatomegaly present, liver nontender to palpation    Hernias:  No hernias present  Lymphatic: Lymph Nodes:  No other lymphadenopathy present  Skin:  General Inspection:  No rashes present, no lesions present, no areas of  discoloration.    Genitalia and Groin:  No rashes present, no lesions present, no areas of  discoloration, no masses present  Neurologic/Psychiatric:    Mental Status:  Oriented X3     Pelvic Exam:  External Genitalia:     Normal appearance for age, no discharge present, no tenderness present, no inflammatory lesions present, color normal  Vagina:     Normal vaginal vault without central or paravaginal defects, no discharge present, no inflammatory lesions present, no masses present  Bladder:     Nontender to palpation  Urethra:   Urethral Body:  Urethra palpation normal, urethra structural support normal   Urethral Meatus:  No erythema or lesions present  Cervix:     Surgically absent  Uterus:     Surgically absent  Adnexa:     No adnexal tenderness present, no adnexal masses present  Perineum:     Perineum within normal limits, no evidence of trauma, no rashes or skin lesions present  Anus:     Anus within normal limits, no hemorrhoids present  Inguinal Lymph Nodes:     No lymphadenopathy present  Pubic Hair:     Normal pubic hair distribution for age  Genitalia and Groin:     No rashes present, no  lesions present, no areas of discoloration, no masses present      COUNSELING:   Reviewed preventive health counseling, as reflected in patient instructions       Regular exercise       Healthy diet/nutrition    BMI:  Body mass index is 20.34 kg/m .     reports that she has been smoking.  She has been smoking about 0.50 packs per day. she has never used smokeless tobacco.  Tobacco Cessation Action Plan: Information offered: Patient not interested at this time    ASSESSMENT:  73 year old female with satisfactory annual exam.    ICD-10-CM    1. Encounter for gynecological examination without abnormal finding Z01.419        PLAN:  Needs dexa  Pap and hpv done  mammogram    Jazmin Perez MD

## 2019-02-19 ASSESSMENT — ANXIETY QUESTIONNAIRES: GAD7 TOTAL SCORE: 0

## 2019-02-20 LAB
COPATH REPORT: NORMAL
PAP: NORMAL

## 2019-02-22 LAB
FINAL DIAGNOSIS: ABNORMAL
HPV HR 12 DNA CVX QL NAA+PROBE: POSITIVE
HPV16 DNA SPEC QL NAA+PROBE: NEGATIVE
HPV18 DNA SPEC QL NAA+PROBE: NEGATIVE
SPECIMEN DESCRIPTION: ABNORMAL
SPECIMEN SOURCE CVX/VAG CYTO: ABNORMAL

## 2020-02-24 NOTE — PROGRESS NOTES
Zoraida is a 74 year old  female who presents for annual exam.     Besides routine health maintenance, she has no other health concerns today .    Do you have a Health Care Directive?: Yes, patient states has an Advance Care Planning document and will bring a copy to the clinic.    Fall risk:   Fallen 2 or more times in the past year?: No  Any fall with injury in the past year?: No    HPI:  The patient's PCP is  James Hernandez MD.    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 Patient is postmenopausal..   reports that she has been smoking. She has been smoking about 0.50 packs per day. She has never used smokeless tobacco.    Patient is sexually active.  STD testing offered?  Declined  Last PHQ-9 score on record=   PHQ-9 SCORE 2019   PHQ-9 Total Score 0     Last GAD7 score on record=   LOC-7 SCORE 2017   Total Score 0 0 0     Alcohol Score = 4    HEALTH MAINTENANCE:  Cholesterol: 2019  Last Mammo: One year ago, Result: Normal, Next Mammo: Today   Pap:   Lab Results   Component Value Date    PAP NIL 2019    PAP NIL 2018    PAP NIL 2017      DEXA:  2015  Colonoscopy:  10/31/2016, Result:  Normal, Next Colonoscopy: 2 years.    Health maintenance updated:  yes    HISTORY:  OB History    Para Term  AB Living   6 2 2 0 4 2   SAB TAB Ectopic Multiple Live Births   4 0 0 0 0      # Outcome Date GA Lbr Charlie/2nd Weight Sex Delivery Anes PTL Lv   6 Term            5 Term            4 SAB            3 SAB            2 SAB            1 SAB              Patient Active Problem List   Diagnosis     Smoking     History of cervical dysplasia     Gastroesophageal reflux disease without esophagitis     Dyslipidemia     Cardiovascular disease     Vaginal dysplasia     Vaginal high risk  HPV DNA test positive     Past Surgical History:   Procedure Laterality Date     HYSTERECTOMY, PAP STILL INDICATED      PAP ONLY.      LAPAROSCOPIC ASSISTED HYSTERECTOMY VAGINAL, BILATERAL SALPINGO-OOPHORECTOMY, COMBINED  2006    recurrent PHILIPPE I      Social History     Tobacco Use     Smoking status: Current Every Day Smoker     Packs/day: 0.50     Smokeless tobacco: Never Used   Substance Use Topics     Alcohol use: Yes     Alcohol/week: 0.0 standard drinks      Problem (# of Occurrences) Relation (Name,Age of Onset)    Coronary Artery Disease (3) Mother, Father, Brother    Hyperlipidemia (3) Mother, Father, Brother            Current Outpatient Medications   Medication Sig     aspirin 81 MG chewable tablet Take 1 tablet (81 mg) by mouth daily     atenolol (TENORMIN) 25 MG tablet Take 1 tablet (25 mg) by mouth daily     atorvastatin (LIPITOR) 10 MG tablet Take 20 mg by mouth daily      Cyanocobalamin (B-12) 2000 MCG TABS Take 2,000-2,500 mcg by mouth     Multiple Vitamin (VITAMIN E/FOLIC ACID/B-6/B-12) CAPS Reported on 2/13/2017     nitroglycerin (NITROSTAT) 0.4 MG sublingual tablet Place 0.4 mg under the tongue     VITAMIN D, CHOLECALCIFEROL, PO Take by mouth daily     No current facility-administered medications for this visit.        Allergies   Allergen Reactions     Penicillins      Sulfa Drugs        Past medical, surgical, social and family history were reviewed and updated in EPIC.    ROS:   12 point review of systems negative other than symptoms noted below or in the HPI.  No urinary frequency or dysuria, bladder or kidney problems    EXAM:  There were no vitals taken for this visit.   BMI: There is no height or weight on file to calculate BMI.    EXAM:  Constitutional: Appearance: Well nourished, well developed alert, in no acute distress  Neck:  Lymph Nodes:  No lymphadenopathy present    Thyroid:  Gland size normal, nontender, no nodules or masses present  on palpation  Chest:  Respiratory Effort:   Breathing unlabored  Cardiovascular:Heart    Auscultation:  Regular rate, normal rhythm, no murmurs present  Breasts: Inspection of Breasts:  No lymphadenopathy present., Palpation of Breasts and Axillae:  No masses present on palpation, no breast tenderness., Axillary Lymph Nodes:  No lymphadenopathy present. and No nodularity, asymmetry or nipple discharge bilaterally.  Gastrointestinal:  Abdominal Examination:  Abdomen nontender to palpation, tone normal without     rigidity or guarding, no masses present, umbilicus without lesions    Liver and speen:  No hepatomegaly present, liver nontender to palpation    Hernias:  No hernias present  Lymphatic: Lymph Nodes:  No other lymphadenopathy present  Skin:  General Inspection:  No rashes present, no lesions present, no areas of  discoloration.    Genitalia and Groin:  No rashes present, no lesions present, no areas of  discoloration, no masses present  Neurologic/Psychiatric:    Mental Status:  Oriented X3     Pelvic Exam:  External Genitalia:     Normal appearance for age, no discharge present, no tenderness present, no inflammatory lesions present, color normal  Vagina:     Normal vaginal vault without central or paravaginal defects, no discharge present, no inflammatory lesions present, no masses present  Bladder:     Nontender to palpation  Urethra:   Urethral Body:  Urethra palpation normal, urethra structural support normal   Urethral Meatus:  No erythema or lesions present  Cervix:     Surgically absent  Uterus:     Surgically absent  Adnexa:     Surgically absent  Perineum:     Perineum within normal limits, no evidence of trauma, no rashes or skin lesions present  Anus:     Anus within normal limits, no hemorrhoids present  Inguinal Lymph Nodes:     No lymphadenopathy present    COUNSELING:   Reviewed preventive health counseling, as reflected in patient instructions       Regular exercise       Healthy diet/nutrition    BMI:  There is no height or weight on  file to calculate BMI.     reports that she has been smoking. She has been smoking about 0.50 packs per day. She has never used smokeless tobacco.  Declines to stop    ASSESSMENT:  74 year old female with satisfactory annual exam.    ICD-10-CM    1. Encounter for gynecological examination without abnormal finding Z01.419        PLAN:  We do pap of vaginal vault every couple yrs so not done this years  Mammogram  Discussed smoking risks      Jazmin Perez MD

## 2020-02-25 ENCOUNTER — ANCILLARY PROCEDURE (OUTPATIENT)
Dept: MAMMOGRAPHY | Facility: CLINIC | Age: 75
End: 2020-02-25
Payer: COMMERCIAL

## 2020-02-25 ENCOUNTER — OFFICE VISIT (OUTPATIENT)
Dept: OBGYN | Facility: CLINIC | Age: 75
End: 2020-02-25
Payer: COMMERCIAL

## 2020-02-25 VITALS
BODY MASS INDEX: 20.02 KG/M2 | HEIGHT: 63 IN | DIASTOLIC BLOOD PRESSURE: 78 MMHG | WEIGHT: 113 LBS | SYSTOLIC BLOOD PRESSURE: 122 MMHG

## 2020-02-25 DIAGNOSIS — Z12.31 VISIT FOR SCREENING MAMMOGRAM: ICD-10-CM

## 2020-02-25 DIAGNOSIS — Z01.419 ENCOUNTER FOR GYNECOLOGICAL EXAMINATION WITHOUT ABNORMAL FINDING: Primary | ICD-10-CM

## 2020-02-25 PROCEDURE — 77063 BREAST TOMOSYNTHESIS BI: CPT | Mod: TC

## 2020-02-25 PROCEDURE — 77067 SCR MAMMO BI INCL CAD: CPT | Mod: TC

## 2020-02-25 PROCEDURE — 99397 PER PM REEVAL EST PAT 65+ YR: CPT | Performed by: OBSTETRICS & GYNECOLOGY

## 2020-02-25 ASSESSMENT — ANXIETY QUESTIONNAIRES
5. BEING SO RESTLESS THAT IT IS HARD TO SIT STILL: NOT AT ALL
IF YOU CHECKED OFF ANY PROBLEMS ON THIS QUESTIONNAIRE, HOW DIFFICULT HAVE THESE PROBLEMS MADE IT FOR YOU TO DO YOUR WORK, TAKE CARE OF THINGS AT HOME, OR GET ALONG WITH OTHER PEOPLE: NOT DIFFICULT AT ALL
3. WORRYING TOO MUCH ABOUT DIFFERENT THINGS: NOT AT ALL
7. FEELING AFRAID AS IF SOMETHING AWFUL MIGHT HAPPEN: NOT AT ALL
6. BECOMING EASILY ANNOYED OR IRRITABLE: NOT AT ALL
1. FEELING NERVOUS, ANXIOUS, OR ON EDGE: NOT AT ALL
GAD7 TOTAL SCORE: 0
2. NOT BEING ABLE TO STOP OR CONTROL WORRYING: NOT AT ALL

## 2020-02-25 ASSESSMENT — MIFFLIN-ST. JEOR: SCORE: 980.65

## 2020-02-25 ASSESSMENT — PATIENT HEALTH QUESTIONNAIRE - PHQ9
5. POOR APPETITE OR OVEREATING: NOT AT ALL
SUM OF ALL RESPONSES TO PHQ QUESTIONS 1-9: 0

## 2020-02-26 ASSESSMENT — ANXIETY QUESTIONNAIRES: GAD7 TOTAL SCORE: 0

## 2021-04-07 NOTE — PROGRESS NOTES
Zoraida is a 75 year old  female who presents for annual exam.     Besides routine health maintenance, she has no other health concerns today .    Do you have a Health Care Directive?: Yes, patient states has an Advance Care Planning document and will bring a copy to the clinic.    Fall risk:   Fallen 2 or more times in the past year?: No  Any fall with injury in the past year?: No    HPI:  The patient's PCP is  James Hernandez MD.    Patient still smoking- 50 years history  Was sent for lung cancer screening    Still working as [email or web address]  We are still doing vaginal pap smears every 2 years due to her history of severe vaginal dysplasia. No HPV testing. Last one was 2019.  No cervix since had hysterectomy     Patient lost wt during covid but getting better  Doesn't want to stop smoking      GYNECOLOGIC HISTORY:  No LMP recorded. Patient is postmenopausal..   reports that she has been smoking. She has been smoking about 0.50 packs per day. She has never used smokeless tobacco.    Patient is sexually active.  STD testing offered?  Declined  Last PHQ-9 score on record=   PHQ-9 SCORE 2021   PHQ-9 Total Score 0     Last GAD7 score on record=   LOC-7 SCORE 2019   Total Score 0 0 0     Alcohol Score = 4    HEALTH MAINTENANCE:  Cholesterol: (No results found for: CHOL   Last Mammo: One year ago, Result: Normal, Next Mammo: Today   Pap: (  Lab Results   Component Value Date    PAP NIL HPV- 2019    PAP NIL 2018    PAP NIL 2017    )  DEXA:  18  Colonoscopy:10/31/2016)Re sult:  Normal, Next Colonoscopy: 10 years.    Health maintenance updated:  yes    HISTORY:  OB History    Para Term  AB Living   6 2 2 0 4 2   SAB TAB Ectopic Multiple Live Births   4 0 0 0 0      # Outcome Date GA Lbr Charlie/2nd Weight Sex Delivery Anes PTL Lv   6 Term            5 Term            4 SAB            3 SAB            2 SAB            1 SAB              Patient Active Problem  "List   Diagnosis     Smoking     History of cervical dysplasia     Gastroesophageal reflux disease without esophagitis     Dyslipidemia     Cardiovascular disease     Vaginal dysplasia     Vaginal high risk HPV DNA test positive     Past Surgical History:   Procedure Laterality Date     HYSTERECTOMY, PAP STILL INDICATED      PAP ONLY.      LAPAROSCOPIC ASSISTED HYSTERECTOMY VAGINAL, BILATERAL SALPINGO-OOPHORECTOMY, COMBINED  2006    recurrent PHILIPPE I      Social History     Tobacco Use     Smoking status: Current Every Day Smoker     Packs/day: 0.50     Smokeless tobacco: Never Used   Substance Use Topics     Alcohol use: Yes     Frequency: 4 or more times a week     Drinks per session: 3 or 4     Binge frequency: Never      Problem (# of Occurrences) Relation (Name,Age of Onset)    Coronary Artery Disease (3) Mother, Father, Brother    Hyperlipidemia (3) Mother, Father, Brother            Current Outpatient Medications   Medication Sig     aspirin 81 MG chewable tablet Take 1 tablet (81 mg) by mouth daily     atenolol (TENORMIN) 25 MG tablet Take 1 tablet (25 mg) by mouth daily     atorvastatin (LIPITOR) 10 MG tablet Take 20 mg by mouth daily      Cyanocobalamin (B-12) 2000 MCG TABS Take 2,000-2,500 mcg by mouth     Multiple Vitamin (VITAMIN E/FOLIC ACID/B-6/B-12) CAPS Reported on 2/13/2017     nitroglycerin (NITROSTAT) 0.4 MG sublingual tablet Place 0.4 mg under the tongue     VITAMIN D, CHOLECALCIFEROL, PO Take by mouth daily     No current facility-administered medications for this visit.        Allergies   Allergen Reactions     Penicillins      Sulfa Drugs        Past medical, surgical, social and family history were reviewed and updated in EPIC.    ROS:   12 point review of systems negative other than symptoms noted below or in the HPI.  No urinary frequency or dysuria, bladder or kidney problems    EXAM:  /78   Pulse 74   Ht 1.6 m (5' 3\")   Wt 49.9 kg (110 lb)   BMI 19.49 kg/m     BMI: Body mass " index is 19.49 kg/m .    EXAM:  Constitutional: Appearance: Well nourished, well developed alert, in no acute distress  Neck:  Lymph Nodes:  No lymphadenopathy present    Thyroid:  Gland size normal, nontender, no nodules or masses present  on palpation  Chest:  Respiratory Effort:  Breathing unlabored  Cardiovascular:Heart    Auscultation:  Regular rate, normal rhythm, no murmurs present  Breasts: Inspection of Breasts:  No lymphadenopathy present., Palpation of Breasts and Axillae:  No masses present on palpation, no breast tenderness., Axillary Lymph Nodes:  No lymphadenopathy present. and No nodularity, asymmetry or nipple discharge bilaterally.  Gastrointestinal:  Abdominal Examination:  Abdomen nontender to palpation, tone normal without     rigidity or guarding, no masses present, umbilicus without lesions    Liver and speen:  No hepatomegaly present, liver nontender to palpation    Hernias:  No hernias present  Lymphatic: Lymph Nodes:  No other lymphadenopathy present  Skin:  General Inspection:  No rashes present, no lesions present, no areas of  discoloration.    Genitalia and Groin:  No rashes present, no lesions present, no areas of  discoloration, no masses present  Neurologic/Psychiatric:    Mental Status:  Oriented X3     Pelvic Exam:  External Genitalia:     Normal appearance for age, no discharge present, no tenderness present, no inflammatory lesions present, color normal  Vagina:     Normal vaginal vault without central or paravaginal defects, no discharge present, no inflammatory lesions present, no masses present  Bladder:     Nontender to palpation  Urethra:   Urethral Body:  Urethra palpation normal, urethra structural support normal   Urethral Meatus:  No erythema or lesions present  No uterus present- prior hyst  Perineum:     Perineum within normal limits, no evidence of trauma, no rashes or skin lesions present  Anus:     Anus within normal limits, no hemorrhoids present  Inguinal Lymph  Nodes:     No lymphadenopathy present  Pubic Hair:     Normal pubic hair distribution for age  Genitalia and Groin:     No rashes present, no lesions present, no areas of discoloration, no masses present      COUNSELING:   Reviewed preventive health counseling, as reflected in patient instructions    BMI:  Body mass index is 19.49 kg/m .     reports that she has been smoking. She has been smoking about 0.50 packs per day. She has never used smokeless tobacco.  Tobacco Cessation Action Plan: Information offered: Patient not interested at this time    ASSESSMENT:  75 year old female with satisfactory annual exam.    ICD-10-CM    1. Encounter for gynecological examination without abnormal finding  Z01.419        PLAN:  Rec annual mammogram  Pap of vaginal vault done due to history of prior vaginal dysplasia  Discussed covid   She had vaccine  Would be good to stop smoking      Jazmin Perez MD

## 2021-04-08 ENCOUNTER — OFFICE VISIT (OUTPATIENT)
Dept: OBGYN | Facility: CLINIC | Age: 76
End: 2021-04-08
Payer: COMMERCIAL

## 2021-04-08 ENCOUNTER — ANCILLARY PROCEDURE (OUTPATIENT)
Dept: MAMMOGRAPHY | Facility: CLINIC | Age: 76
End: 2021-04-08
Payer: COMMERCIAL

## 2021-04-08 VITALS
SYSTOLIC BLOOD PRESSURE: 122 MMHG | HEART RATE: 74 BPM | DIASTOLIC BLOOD PRESSURE: 78 MMHG | WEIGHT: 110 LBS | HEIGHT: 63 IN | BODY MASS INDEX: 19.49 KG/M2

## 2021-04-08 DIAGNOSIS — Z12.31 VISIT FOR SCREENING MAMMOGRAM: ICD-10-CM

## 2021-04-08 DIAGNOSIS — N89.3 VAGINAL DYSPLASIA: ICD-10-CM

## 2021-04-08 DIAGNOSIS — Z01.419 ENCOUNTER FOR GYNECOLOGICAL EXAMINATION WITHOUT ABNORMAL FINDING: Primary | ICD-10-CM

## 2021-04-08 PROCEDURE — 77067 SCR MAMMO BI INCL CAD: CPT | Mod: TC | Performed by: RADIOLOGY

## 2021-04-08 PROCEDURE — G0145 SCR C/V CYTO,THINLAYER,RESCR: HCPCS | Performed by: OBSTETRICS & GYNECOLOGY

## 2021-04-08 PROCEDURE — 77063 BREAST TOMOSYNTHESIS BI: CPT | Mod: TC | Performed by: RADIOLOGY

## 2021-04-08 PROCEDURE — 99397 PER PM REEVAL EST PAT 65+ YR: CPT | Performed by: OBSTETRICS & GYNECOLOGY

## 2021-04-08 SDOH — HEALTH STABILITY: MENTAL HEALTH: HOW MANY STANDARD DRINKS CONTAINING ALCOHOL DO YOU HAVE ON A TYPICAL DAY?: 3 OR 4

## 2021-04-08 SDOH — HEALTH STABILITY: MENTAL HEALTH: HOW OFTEN DO YOU HAVE 6 OR MORE DRINKS ON ONE OCCASION?: NEVER

## 2021-04-08 SDOH — HEALTH STABILITY: MENTAL HEALTH: HOW OFTEN DO YOU HAVE A DRINK CONTAINING ALCOHOL?: 4 OR MORE TIMES A WEEK

## 2021-04-08 ASSESSMENT — ANXIETY QUESTIONNAIRES
6. BECOMING EASILY ANNOYED OR IRRITABLE: NOT AT ALL
3. WORRYING TOO MUCH ABOUT DIFFERENT THINGS: NOT AT ALL
IF YOU CHECKED OFF ANY PROBLEMS ON THIS QUESTIONNAIRE, HOW DIFFICULT HAVE THESE PROBLEMS MADE IT FOR YOU TO DO YOUR WORK, TAKE CARE OF THINGS AT HOME, OR GET ALONG WITH OTHER PEOPLE: NOT DIFFICULT AT ALL
5. BEING SO RESTLESS THAT IT IS HARD TO SIT STILL: NOT AT ALL
2. NOT BEING ABLE TO STOP OR CONTROL WORRYING: NOT AT ALL
GAD7 TOTAL SCORE: 0
7. FEELING AFRAID AS IF SOMETHING AWFUL MIGHT HAPPEN: NOT AT ALL
1. FEELING NERVOUS, ANXIOUS, OR ON EDGE: NOT AT ALL

## 2021-04-08 ASSESSMENT — PATIENT HEALTH QUESTIONNAIRE - PHQ9
5. POOR APPETITE OR OVEREATING: NOT AT ALL
SUM OF ALL RESPONSES TO PHQ QUESTIONS 1-9: 0

## 2021-04-08 ASSESSMENT — MIFFLIN-ST. JEOR: SCORE: 958.09

## 2021-04-09 ASSESSMENT — ANXIETY QUESTIONNAIRES: GAD7 TOTAL SCORE: 0

## 2021-04-12 LAB
COPATH REPORT: NORMAL
PAP: NORMAL

## 2023-05-10 ENCOUNTER — ANCILLARY ORDERS (OUTPATIENT)
Dept: OBGYN | Facility: CLINIC | Age: 78
End: 2023-05-10

## 2023-05-10 DIAGNOSIS — Z12.31 VISIT FOR SCREENING MAMMOGRAM: ICD-10-CM

## 2023-06-12 NOTE — PROGRESS NOTES
Zoraida is a 77 year old  female who presents for annual exam.     Besides routine health maintenance, she has no other health concerns today .    Do you have a Health Care Directive?: No, advance care planning information given to patient to review.  Patient plans to discuss their wishes with loved ones or provider.      Fall risk:   Fall Risk Assessment completed per order.    HPI:  The patient's PCP is  James Hernandez MD.    No gynecologic complaints    Per previous plan :We are still doing vaginal pap smears every 2 years due to her history of severe vaginal dysplasia. No HPV testing. Last one was .  No cervix since had hysterectomy     GYNECOLOGIC HISTORY:  No LMP recorded. Patient is postmenopausal..   reports that she has been smoking. She has been smoking an average of .5 packs per day. She has never used smokeless tobacco.  Tobacco Cessation Action Plan: Information offered: Patient not interested at this time  Patient is sexually active.  STD testing offered?  Declined  Last PHQ-9 score on record=       2021    11:28 AM   PHQ-9 SCORE   PHQ-9 Total Score 0     Last GAD7 score on record=       2019    12:09 PM 2020     8:57 AM 2021    11:28 AM   LOC-7 SCORE   Total Score 0 0 0       HEALTH MAINTENANCE:  Cholesterol: (No results found for: CHOL   Last Mammo: 20, Result: Normal, Next Mammo: Today   Pap: (  Lab Results   Component Value Date    PAP NIL-neg hpv 2021    PAP NIL-HRHPV + 2019    PAP NIL-neg hpv 2018      DEXA:  2019  Colonoscopy:  10/31/2016, Result:  Normal, Next Colonoscopy: discointinued    Health maintenance updated:  yes    HISTORY:  OB History    Para Term  AB Living   6 2 2 0 4 2   SAB IAB Ectopic Multiple Live Births   4 0 0 0 0      # Outcome Date GA Lbr Charlie/2nd Weight Sex Delivery Anes PTL Lv   6 Term            5 Term            4 SAB            3 SAB            2 SAB            1 SAB              Patient Active  Problem List   Diagnosis     Smoking     History of cervical dysplasia     Gastroesophageal reflux disease without esophagitis     Dyslipidemia     Cardiovascular disease     Vaginal dysplasia     Vaginal high risk HPV DNA test positive     Past Surgical History:   Procedure Laterality Date     HYSTERECTOMY, PAP STILL INDICATED      PAP ONLY.      LAPAROSCOPIC ASSISTED HYSTERECTOMY VAGINAL, BILATERAL SALPINGO-OOPHORECTOMY, COMBINED  2006    recurrent PHILIPPE I      Social History     Tobacco Use     Smoking status: Every Day     Packs/day: 0.50     Types: Cigarettes     Smokeless tobacco: Never   Vaping Use     Vaping status: Not on file   Substance Use Topics     Alcohol use: Yes      Problem (# of Occurrences) Relation (Name,Age of Onset)    Hyperlipidemia (3) Mother, Father, Brother    Coronary Artery Disease (3) Mother, Father, Brother            Current Outpatient Medications   Medication Sig     aspirin 81 MG chewable tablet Take 1 tablet (81 mg) by mouth daily     atenolol (TENORMIN) 25 MG tablet Take 1 tablet (25 mg) by mouth daily     Cyanocobalamin (B-12) 2000 MCG TABS Take 2,000-2,500 mcg by mouth     hydrOXYzine (ATARAX) 10 MG/5ML syrup Take 10 mg by mouth     Multiple Vitamin (VITAMIN E/FOLIC ACID/B-6/B-12) CAPS Reported on 2/13/2017     pravastatin (PRAVACHOL) 20 MG tablet Take 1 tablet by mouth At Bedtime     rivaroxaban ANTICOAGULANT (XARELTO ANTICOAGULANT) 2.5 MG TABS tablet Take 2.5 mg by mouth     VITAMIN D, CHOLECALCIFEROL, PO Take by mouth daily     atorvastatin (LIPITOR) 10 MG tablet Take 20 mg by mouth daily  (Patient not taking: Reported on 6/13/2023)     nitroglycerin (NITROSTAT) 0.4 MG sublingual tablet Place 0.4 mg under the tongue (Patient not taking: Reported on 6/13/2023)     No current facility-administered medications for this visit.       Allergies   Allergen Reactions     Penicillins      Sulfa Antibiotics        Past medical, surgical, social and family history were reviewed and  "updated in EPIC.    ROS:   12 point review of systems negative other than symptoms noted below or in the HPI.    EXAM:  /68   Ht 1.58 m (5' 2.21\")   Wt 50.4 kg (111 lb 3.2 oz)   BMI 20.20 kg/m     BMI: Body mass index is 20.2 kg/m .    EXAM:  Constitutional: Appearance: Well nourished, well developed alert, in no acute distress  Neck:  Lymph Nodes:  No lymphadenopathy present    Thyroid:  Gland size normal, nontender, no nodules or masses present  on palpation  Chest:  Respiratory Effort:  Breathing unlabored  Cardiovascular:Heart    Auscultation:  Regular rate, normal rhythm, no murmurs present  Breasts: Palpation of Breasts and Axillae:  No masses present on palpation, no breast tenderness., Axillary Lymph Nodes:  No lymphadenopathy present. and No nodularity, asymmetry or nipple discharge bilaterally.  Gastrointestinal:  Abdominal Examination:  Abdomen nontender to palpation, tone normal without     rigidity or guarding, no masses present, umbilicus without lesions    Liver and speen:  No hepatomegaly present, liver nontender to palpation    Hernias:  No hernias present  Lymphatic: Lymph Nodes:  No other lymphadenopathy present  Skin:  General Inspection:  No rashes present, no lesions present, no areas of  discoloration.    Genitalia and Groin:  No rashes present, no lesions present, no areas of  discoloration, no masses present  Neurologic/Psychiatric:    Mental Status:  Oriented X3     Pelvic Exam:  External Genitalia:     Normal appearance for age, no discharge present, no tenderness present, no inflammatory lesions present, color normal  Vagina:     Normal vaginal vault without central or paravaginal defects, no discharge present, no inflammatory lesions present, no masses present  Bladder:     Nontender to palpation  Urethra:   Urethral Body:  Urethra palpation normal, urethra structural support normal   Urethral Meatus:  No erythema or lesions present  Cervix:     Surgically absent  Uterus:  "    Surgically absent  Adnexa:     Surgically absent  Perineum:     Perineum within normal limits, no evidence of trauma, no rashes or skin lesions present  Anus:     Anus within normal limits, no hemorrhoids present  Inguinal Lymph Nodes:     No lymphadenopathy present    COUNSELING:   Reviewed preventive health counseling, as reflected in patient instructions  Special attention given to:       Regular exercise       Healthy diet/nutrition       Fall risk prevention    BMI:  Body mass index is 20.2 kg/m .  Weight management plan noted, stable and monitoring   reports that she has been smoking. She has been smoking an average of .5 packs per day. She has never used smokeless tobacco.  Tobacco Cessation Action Plan: Reviewed options for smoking cessation when patient is ready and interested.    ASSESSMENT:  77 year old female with satisfactory annual exam.    ICD-10-CM    1. Encounter for routine gynecologic examination in Medicare patient  Z01.419       2. Vaginal high risk HPV DNA test positive  R87.811 Pap thin layer screen only - recommended age 21 - 24 years          PLAN:  -Normal exam today.  -Vaginal pap.  If normal repeat in 2 years  -Smoking cessation encouraged  -mammogram today      Vannesa Cohen MD

## 2023-06-13 ENCOUNTER — ANCILLARY PROCEDURE (OUTPATIENT)
Dept: MAMMOGRAPHY | Facility: CLINIC | Age: 78
End: 2023-06-13
Payer: COMMERCIAL

## 2023-06-13 ENCOUNTER — OFFICE VISIT (OUTPATIENT)
Dept: OBGYN | Facility: CLINIC | Age: 78
End: 2023-06-13
Payer: COMMERCIAL

## 2023-06-13 ENCOUNTER — ANCILLARY ORDERS (OUTPATIENT)
Dept: OBGYN | Facility: CLINIC | Age: 78
End: 2023-06-13

## 2023-06-13 VITALS
SYSTOLIC BLOOD PRESSURE: 122 MMHG | BODY MASS INDEX: 20.46 KG/M2 | DIASTOLIC BLOOD PRESSURE: 68 MMHG | HEIGHT: 62 IN | WEIGHT: 111.2 LBS

## 2023-06-13 DIAGNOSIS — Z12.31 VISIT FOR SCREENING MAMMOGRAM: ICD-10-CM

## 2023-06-13 DIAGNOSIS — R87.811 VAGINAL HIGH RISK HPV DNA TEST POSITIVE: ICD-10-CM

## 2023-06-13 DIAGNOSIS — Z78.0 ASYMPTOMATIC POSTMENOPAUSAL STATE: ICD-10-CM

## 2023-06-13 DIAGNOSIS — Z01.419 ENCOUNTER FOR ROUTINE GYNECOLOGIC EXAMINATION IN MEDICARE PATIENT: Primary | ICD-10-CM

## 2023-06-13 PROCEDURE — 77067 SCR MAMMO BI INCL CAD: CPT | Mod: TC | Performed by: RADIOLOGY

## 2023-06-13 PROCEDURE — 99213 OFFICE O/P EST LOW 20 MIN: CPT | Performed by: OBSTETRICS & GYNECOLOGY

## 2023-06-13 PROCEDURE — G0145 SCR C/V CYTO,THINLAYER,RESCR: HCPCS | Performed by: OBSTETRICS & GYNECOLOGY

## 2023-06-13 PROCEDURE — 77063 BREAST TOMOSYNTHESIS BI: CPT | Mod: TC | Performed by: RADIOLOGY

## 2023-06-13 RX ORDER — PRAVASTATIN SODIUM 20 MG
1 TABLET ORAL AT BEDTIME
COMMUNITY
Start: 2023-05-16

## 2023-06-13 RX ORDER — HYDROXYZINE HCL 10 MG/5 ML
10 SOLUTION, ORAL ORAL
COMMUNITY
Start: 2022-02-11

## 2023-06-13 ASSESSMENT — ANXIETY QUESTIONNAIRES
GAD7 TOTAL SCORE: 0
GAD7 TOTAL SCORE: 0
3. WORRYING TOO MUCH ABOUT DIFFERENT THINGS: NOT AT ALL
5. BEING SO RESTLESS THAT IT IS HARD TO SIT STILL: NOT AT ALL
1. FEELING NERVOUS, ANXIOUS, OR ON EDGE: NOT AT ALL
7. FEELING AFRAID AS IF SOMETHING AWFUL MIGHT HAPPEN: NOT AT ALL
6. BECOMING EASILY ANNOYED OR IRRITABLE: NOT AT ALL
2. NOT BEING ABLE TO STOP OR CONTROL WORRYING: NOT AT ALL
IF YOU CHECKED OFF ANY PROBLEMS ON THIS QUESTIONNAIRE, HOW DIFFICULT HAVE THESE PROBLEMS MADE IT FOR YOU TO DO YOUR WORK, TAKE CARE OF THINGS AT HOME, OR GET ALONG WITH OTHER PEOPLE: NOT DIFFICULT AT ALL

## 2023-06-13 ASSESSMENT — PATIENT HEALTH QUESTIONNAIRE - PHQ9
SUM OF ALL RESPONSES TO PHQ QUESTIONS 1-9: 0
5. POOR APPETITE OR OVEREATING: NOT AT ALL

## 2023-06-15 LAB
BKR LAB AP GYN ADEQUACY: NORMAL
BKR LAB AP GYN INTERPRETATION: NORMAL
BKR LAB AP HPV REFLEX: NO
BKR LAB AP PREVIOUS ABNL DX: NORMAL
BKR LAB AP PREVIOUS ABNORMAL: NORMAL
PATH REPORT.COMMENTS IMP SPEC: NORMAL
PATH REPORT.COMMENTS IMP SPEC: NORMAL
PATH REPORT.RELEVANT HX SPEC: NORMAL

## 2023-06-16 ENCOUNTER — PATIENT OUTREACH (OUTPATIENT)
Dept: OBGYN | Facility: CLINIC | Age: 78
End: 2023-06-16
Payer: COMMERCIAL

## 2023-06-19 ENCOUNTER — ANCILLARY PROCEDURE (OUTPATIENT)
Dept: BONE DENSITY | Facility: CLINIC | Age: 78
End: 2023-06-19
Attending: OBSTETRICS & GYNECOLOGY
Payer: COMMERCIAL

## 2023-06-19 DIAGNOSIS — Z78.0 ASYMPTOMATIC POSTMENOPAUSAL STATE: ICD-10-CM

## 2023-06-19 PROCEDURE — 77080 DXA BONE DENSITY AXIAL: CPT | Mod: TC | Performed by: PHYSICIAN ASSISTANT

## 2023-07-20 ENCOUNTER — TELEPHONE (OUTPATIENT)
Dept: OBGYN | Facility: CLINIC | Age: 78
End: 2023-07-20
Payer: COMMERCIAL

## 2023-07-20 NOTE — TELEPHONE ENCOUNTER
Reason for call:  Other   Patient called regarding (reason for call): call back  Additional comments:  Question on how to convert micrograms into milo grams for over counter Vit. D     Phone number to reach patient:  Cell number on file:    Telephone Information:   Mobile [phone]       Best Time:  Any    Can we leave a detailed message on this number?  Not Applicable    Travel screening: Not Applicable

## 2023-07-20 NOTE — TELEPHONE ENCOUNTER
Pt calling w/Question on how to convert micrograms into milo grams for over counter Vit. D     6/16/23: DEXA  Please let patient know that her Dexa shows osteopenia (aka low bone mineral density).      Recommend daily intake of 1200-1500mg calcium and 800mg Vt D    Routing pt mychart message to provider to advise.  Barber Flores RN on 7/20/2023 at 5:24 PM

## 2023-07-21 NOTE — TELEPHONE ENCOUNTER
Unable to leave message with pt as phone is out of service.  No [facility] set up  Barber Flores RN on 7/21/2023 at 12:29 PM

## 2023-07-24 NOTE — TELEPHONE ENCOUNTER
Unable to leave message with pt as phone is out of service.  No [facility] set up  Barber Flores RN on 7/24/2023 at 11:24 AM

## 2024-06-14 ENCOUNTER — ANCILLARY PROCEDURE (OUTPATIENT)
Dept: MAMMOGRAPHY | Facility: CLINIC | Age: 79
End: 2024-06-14
Attending: OBSTETRICS & GYNECOLOGY
Payer: COMMERCIAL

## 2024-06-14 DIAGNOSIS — Z12.31 VISIT FOR SCREENING MAMMOGRAM: ICD-10-CM

## 2024-06-14 PROCEDURE — 77067 SCR MAMMO BI INCL CAD: CPT | Mod: TC | Performed by: STUDENT IN AN ORGANIZED HEALTH CARE EDUCATION/TRAINING PROGRAM

## 2024-06-14 PROCEDURE — 77063 BREAST TOMOSYNTHESIS BI: CPT | Mod: TC | Performed by: STUDENT IN AN ORGANIZED HEALTH CARE EDUCATION/TRAINING PROGRAM

## 2025-06-10 ENCOUNTER — PATIENT OUTREACH (OUTPATIENT)
Dept: OBGYN | Facility: CLINIC | Age: 80
End: 2025-06-10
Payer: COMMERCIAL

## 2025-06-10 DIAGNOSIS — R87.811 VAGINAL HIGH RISK HPV DNA TEST POSITIVE: Primary | ICD-10-CM

## 2025-06-10 NOTE — LETTER
Moira 10, 2025      Zoraida Hammond  [address] [id number]        Dear ,    This letter is to remind you that you are due for your follow-up Pap smear.    Please call [phone] to schedule your appointment at your earliest convenience.    If you have completed the appointment outside of the [facility] system, please have the records forwarded to our office. We will update your chart for your provider to review before your next annual wellness visit.     Thank you for choosing [facility]!      Sincerely,    Your [facility] Care Team